# Patient Record
Sex: FEMALE | Race: WHITE | NOT HISPANIC OR LATINO | URBAN - METROPOLITAN AREA
[De-identification: names, ages, dates, MRNs, and addresses within clinical notes are randomized per-mention and may not be internally consistent; named-entity substitution may affect disease eponyms.]

---

## 2018-04-23 NOTE — PATIENT DISCUSSION
EPIPHORA WITH CLEARED NLDO, OD: WITH NASOLACRIMAL DUCT OBSTRUCTION AS NOTED ON PUNCTAL DILATION AND IRRIGATION. OBSTRUCTION CLEARED WITH IRRIGATION. PRESCRIBED ARTIFICIAL TEARS. RETURN FOR FOLLOW UP AS SCHEDULED OR SOONER IF SYMPTOMS WORSEN. ADD LOTEMAX QID FOR TWO WEEKS THEN BID FOR TWO WEEKS.

## 2018-04-23 NOTE — PATIENT DISCUSSION
New Prescription: Lotemax (loteprednol etabonate): gel: 0.5% 1 drop four times a day into both eyes 04-

## 2018-06-26 NOTE — PATIENT DISCUSSION
EPIPHORA WITH RECURRENT NLDO, OD: WITH NASOLACRIMAL DUCT OBSTRUCTION AS NOTED ON PUNCTAL DILATION AND IRRIGATION. OBSTRUCTION CLEARED WITH IRRIGATION. PRESCRIBED ARTIFICIAL TEARS. REFER TO ANNALEE. CONTINUE WARM COMPRESSES. START DOXY (50 MG) ONCE A DAY FOR 1 MONTH AND TOBRADEX CHRIS QHS OU.

## 2018-06-26 NOTE — PATIENT DISCUSSION
Continue: TobraDex (tobramycin-dexamethasone): ointment: 0.3-0.1% 1 a small amount at bedtime as directed into affected eye 06-

## 2018-06-26 NOTE — PATIENT DISCUSSION
Continue: doxycycline hyclate (doxycycline hyclate): tablet,delayed release (DR/EC): 50 mg 1 tablet once a day with meals by mouth 06-

## 2018-08-29 NOTE — PATIENT DISCUSSION
The patient has punctal phimosis with chronic epiphora  A punctal 2-snip was done with domingo verma by removing a wedge of posterior punctum on the left side. This was followed by irrigation. The purpose of this surgery was to improve the patients tear outflow on the affected side. The patient was given antibiotic ointment at the completion of this procedure.

## 2018-08-29 NOTE — PATIENT DISCUSSION
Punctal Dilation and Irrigation: The patient had a history of chronic epiphora on the left side. Punctal/Canalicular/Nasolacrimal Duct Obstruction was suspected. After informed consent a punctal dilator was placed in the affected punctum, which was dilated appropriately. A 1cc syringe filled with sterile eyewash with a blunt canula was prepared the blunt canula was inserted into the canalicular system. The result of the irrigation was as follows: irrigates well.

## 2018-08-29 NOTE — PATIENT DISCUSSION
The patient has punctal phimosis with chronic epiphora. The risks and benefits of a punctal 2-snip were discussed. The patient understands and wishes to proceed.

## 2018-08-29 NOTE — PATIENT DISCUSSION
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 A intranasal approach for a dacryocystorhinostomy was planned. The purpose of the nasal endoscopy was to evaluate a surgical site for abnormalities which could alter the surgical course including turbinate hypertrophy or septal deviation. A Stortz 0 degree endoscope was placed intranasally on both sides and the following observations were noted: mild turbinate enlargement w/ small NSD to the left, normal mucosa.

## 2018-08-29 NOTE — PATIENT DISCUSSION
The patient has punctal phimosis with chronic epiphora  A punctal 2-snip was done with domingo verma by removing a wedge of posterior punctum on the right side. This was followed by irrigation. The purpose of this surgery was to improve the patients tear outflow on the affected side. The patient was given antibiotic ointment at the completion of this procedure.

## 2018-08-29 NOTE — PATIENT DISCUSSION
Punctal Dilation and Irrigation: The patient had a history of chronic epiphora on the right side. Punctal/Canalicular/Nasolacrimal Duct Obstruction was suspected. After informed consent a punctal dilator was placed in the affected punctum, which was dilated appropriately. A 1cc syringe filled with sterile eyewash with a blunt canula was prepared the blunt canula was inserted into the canalicular system. The result of the irrigation was as follows: irrigates well.

## 2019-02-11 NOTE — PATIENT DISCUSSION
New Prescription: Lotemax (loteprednol etabonate): ointment: 0.5% 1 a thin layer at bedtime as directed on eyelid 02-

## 2019-02-11 NOTE — PATIENT DISCUSSION
CONJUNCTIVITIS (ALLERGIC), ou:  PRESCRIBE pataday, lotemax or tobradex  ramo qhs. RETURN FOR FOLLOW-UP AS SCHEDULED.

## 2020-01-31 NOTE — PATIENT DISCUSSION
New Prescription: loteprednol etabonate (loteprednol etabonate): drops,suspension: 0.5% 1 drop twice a day into affected eye 01-

## 2020-01-31 NOTE — PATIENT DISCUSSION
MEIBOMIAN GLAND DYSFUNCTION, OU: PRESCRIBE WARM COMPRESSES AND EYELID SCRUBS QD-BID, ARTIFICIAL TEARS BID-QID, THE DAILY INTAKE OF OMEGA-3 FATTY ACIDS AND LOTEMAX BID FOR ONE MONTH AND TOBRADEX X 2 WEEKS. WILL CONSIDER LIPIFLOW TREATMENT NEXT VISIT IF NOT RESPONSIVE TO TREATMENT OR IF SYMPTOMS PERSIST. RETURN FOR FOLLOW-UP AS SCHEDULED.

## 2020-11-02 ENCOUNTER — IMPORTED ENCOUNTER (OUTPATIENT)
Dept: URBAN - METROPOLITAN AREA CLINIC 9 | Facility: CLINIC | Age: 75
End: 2020-11-02

## 2020-11-24 NOTE — PATIENT DISCUSSION
New Prescription: TobraDex (tobramycin-dexamethasone): ointment: 0.3-0.1% 1 a small amount at bedtime as directed into both eyes 11-

## 2020-11-24 NOTE — PATIENT DISCUSSION
MEIBOMIAN GLAND DYSFUNCTION, OU:  PRESCRIBE WARM COMPRESSES AND EYELID SCRUBS QD-BID, ARTIFICIAL TEARS BID-QID, THE DAILY INTAKE OF OMEGA-3 FATTY ACIDS  AND TOBRADEX CHRIS QHS X 2 WEEKS, MINOCYCLINE QDAY X 3 WEEKS. WILL CONSIDER LIPIFLOW TREATMENT NEXT VISIT IF NOT RESPONSIVE TO TREATMENT OR IF SYMPTOMS PERSIST. RETURN FOR FOLLOW-UP AS SCHEDULED.

## 2020-11-24 NOTE — PATIENT DISCUSSION
New Prescription: minocycline (minocycline): capsule,extended release 24hr: 45 mg 1 capsule once a day by mouth 11-

## 2021-01-13 NOTE — PATIENT DISCUSSION
New Prescription: Lotemax SM (loteprednol etabonate): drops,gel: 0.38% 1 drop twice a day as directed into both eyes 01-

## 2021-01-13 NOTE — PATIENT DISCUSSION
MGD OU - UNABLE TO TOLERATE DOXY OR ORAL OMEGA DUE TO ULCERATIVE COLITIS.  CONTINUE WC 15MIN DAILY, TOBRADEX CHRIS QHS PRN

## 2021-01-13 NOTE — PATIENT DISCUSSION
New Prescription: Restasis (cyclosporine): dropperette: 0.05% 1 drop twice a day as directed into both eyes 01-

## 2021-04-13 NOTE — PATIENT DISCUSSION
[address] OS&gt;OD - EXACERBATED BY MELISSA. RELATED TO UC. CONTINUE RESTASIS BID OU. ADD LL PUNCTAL PLUGS TODAY.

## 2021-04-13 NOTE — PATIENT DISCUSSION
Migraine Counseling: I have discussed the diagnosis and the pathophysiology of these symptoms with the patient. It is important to reduce stress and pinpoint agents that precipitate the headache. Keeping a diary may help in identifying certain foods, alcohol, or other triggers. Referral to a neurologist may be necessary for medical management. If symptoms increase in frequency or severity, prophylactic medication may be considered. Return for follow-up as scheduled or sooner if symptoms worsen.

## 2021-05-24 ENCOUNTER — PREPPED CHART (OUTPATIENT)
Dept: URBAN - METROPOLITAN AREA CLINIC 14 | Facility: CLINIC | Age: 76
End: 2021-05-24

## 2021-05-24 ENCOUNTER — IMPORTED ENCOUNTER (OUTPATIENT)
Dept: URBAN - METROPOLITAN AREA CLINIC 9 | Facility: CLINIC | Age: 76
End: 2021-05-24

## 2021-05-26 ENCOUNTER — IMPORTED ENCOUNTER (OUTPATIENT)
Dept: URBAN - METROPOLITAN AREA CLINIC 9 | Facility: CLINIC | Age: 76
End: 2021-05-26

## 2021-10-16 ASSESSMENT — VISUAL ACUITY
OD_CC: 20/40 -2 SN
OS_CC: 20/40 SN
OD_CC: 20/40 + SN
OS_CC: 20/40 + SN
OD_CC: 20/40 - SN
OD_CC: 20/40 + SN
OS_CC: 20/40 -2 SN
OS_CC: 20/40 -2 SN

## 2021-10-16 ASSESSMENT — TONOMETRY
OD_IOP_MMHG: 10
OD_IOP_MMHG: 12
OS_IOP_MMHG: 13
OS_IOP_MMHG: 10

## 2021-10-26 NOTE — PATIENT DISCUSSION
Discussed treatment options including: warm compresses, lid hygiene, fish oil, lipid-based artificial tears, etc.

## 2021-12-08 ENCOUNTER — ESTABLISHED PATIENT (OUTPATIENT)
Dept: URBAN - METROPOLITAN AREA CLINIC 14 | Facility: CLINIC | Age: 76
End: 2021-12-08

## 2021-12-08 DIAGNOSIS — H18.603: ICD-10-CM

## 2021-12-08 DIAGNOSIS — H25.13: ICD-10-CM

## 2021-12-08 DIAGNOSIS — H35.033: ICD-10-CM

## 2021-12-08 PROCEDURE — 92014 COMPRE OPH EXAM EST PT 1/>: CPT

## 2021-12-08 PROCEDURE — 92134 CPTRZ OPH DX IMG PST SGM RTA: CPT

## 2021-12-08 PROCEDURE — 92025 CPTRIZED CORNEAL TOPOGRAPHY: CPT

## 2021-12-08 ASSESSMENT — TONOMETRY
OS_IOP_MMHG: 13
OD_IOP_MMHG: 12

## 2021-12-08 ASSESSMENT — VISUAL ACUITY
OS_PH: 20/70+1
OD_PH: 20/40
OD_CC: 20/40-1
OS_SC: 20/400
OS_CC: 20/50+1
OD_SC: 20/400

## 2022-12-02 NOTE — PATIENT DISCUSSION
Becoming visually significant, is noticing more difficulty with distance vision OU. Will dilate and refract at next visit.

## 2022-12-02 NOTE — PROCEDURE NOTE: CLINICAL
PROCEDURE NOTE: Excision of Eyelid Lesion #1 Left Lower Lid. 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 Anesthesia: Topical. Prep: Betadine Flush. Prior to treatment, the risks/benefits/alternatives were discussed. The patient wished to proceed with procedure. Local anesthetic was given. The eyelid was prepped and draped for procedure. The lesion was incised and removed. The wound was repaired with sutures. Patient tolerated procedure well. There were no complications. Post procedure instructions given. Saran Greenberg

## 2022-12-21 NOTE — PATIENT DISCUSSION
Ed. patient. no FB on exam. Likely MELISSA related. recommend frequent PF art tears. Monitor with Dr. Jesse Perry as scheduled.